# Patient Record
Sex: MALE | Race: WHITE | NOT HISPANIC OR LATINO
[De-identification: names, ages, dates, MRNs, and addresses within clinical notes are randomized per-mention and may not be internally consistent; named-entity substitution may affect disease eponyms.]

---

## 2020-06-28 ENCOUNTER — ERX REFILL RESPONSE (OUTPATIENT)
Age: 65
End: 2020-06-28

## 2020-06-28 RX ORDER — PANTOPRAZOLE SODIUM 40 MG/1
TAKE ONE TABLET BY MOUTH TWICE A DAY TABLET, DELAYED RELEASE ORAL
Qty: 60 | Refills: 1

## 2020-12-03 ENCOUNTER — TELEPHONE ENCOUNTER (OUTPATIENT)
Dept: URBAN - NONMETROPOLITAN AREA CLINIC 2 | Facility: CLINIC | Age: 65
End: 2020-12-03

## 2020-12-04 ENCOUNTER — TELEPHONE ENCOUNTER (OUTPATIENT)
Dept: URBAN - NONMETROPOLITAN AREA CLINIC 2 | Facility: CLINIC | Age: 65
End: 2020-12-04

## 2020-12-09 ENCOUNTER — OFFICE VISIT (OUTPATIENT)
Dept: URBAN - NONMETROPOLITAN AREA CLINIC 2 | Facility: CLINIC | Age: 65
End: 2020-12-09
Payer: COMMERCIAL

## 2020-12-09 DIAGNOSIS — R63.4 WEIGHT LOSS: ICD-10-CM

## 2020-12-09 DIAGNOSIS — C15.9 MALIGNANT NEOPLASM OF ESOPHAGUS, UNSPECIFIED LOCATION: ICD-10-CM

## 2020-12-09 PROCEDURE — G8427 DOCREV CUR MEDS BY ELIG CLIN: HCPCS | Performed by: NURSE PRACTITIONER

## 2020-12-09 PROCEDURE — G9903 PT SCRN TBCO ID AS NON USER: HCPCS | Performed by: NURSE PRACTITIONER

## 2020-12-09 PROCEDURE — 99213 OFFICE O/P EST LOW 20 MIN: CPT | Performed by: NURSE PRACTITIONER

## 2020-12-09 PROCEDURE — G8420 CALC BMI NORM PARAMETERS: HCPCS | Performed by: NURSE PRACTITIONER

## 2020-12-09 RX ORDER — PANTOPRAZOLE SODIUM 40 MG/1
TAKE ONE TABLET BY MOUTH TWICE A DAY TABLET, DELAYED RELEASE ORAL
Qty: 60 | Refills: 1 | Status: ACTIVE | COMMUNITY

## 2020-12-09 RX ORDER — ALBUTEROL SULFATE 90 UG/1
AEROSOL, METERED RESPIRATORY (INHALATION)
Qty: 0 | Refills: 0 | Status: ACTIVE | COMMUNITY
Start: 1900-01-01 | End: 1900-01-01

## 2020-12-09 NOTE — HPI-TODAY'S VISIT:
12/9/2020 Mr. Jordi Aldrich is here for possible PEG placement. He was seen in November by Dr. Chandler and diagnosed with esophageal adenocarcinoma. He was referred to Dr. Jeffery who requested an EUS for staging. This showed evidence of T3 disease. Given the obstruction nature the scope was unable to be advanced through the GE junction. Today, he reports he has stopped seeing Dr. Jeffery. He declined chemo and radiation. He is on hospice. He has lost a considerable amount of weight. He can only eat small amounts of soft foods. He is vomiting after most meals. He is on phenergan and zofran with continued N/V. He is on a fair amount of pain medication. CS

## 2020-12-18 ENCOUNTER — OFFICE VISIT (OUTPATIENT)
Dept: URBAN - METROPOLITAN AREA TELEHEALTH 2 | Facility: TELEHEALTH | Age: 65
End: 2020-12-18
Payer: COMMERCIAL

## 2020-12-18 DIAGNOSIS — C15.9 MALIGNANT NEOPLASM OF ESOPHAGUS, UNSPECIFIED LOCATION: ICD-10-CM

## 2020-12-18 DIAGNOSIS — R63.4 WEIGHT LOSS: ICD-10-CM

## 2020-12-18 PROCEDURE — G8420 CALC BMI NORM PARAMETERS: HCPCS | Performed by: INTERNAL MEDICINE

## 2020-12-18 PROCEDURE — 4004F PT TOBACCO SCREEN RCVD TLK: CPT | Performed by: INTERNAL MEDICINE

## 2020-12-18 PROCEDURE — 99212 OFFICE O/P EST SF 10 MIN: CPT | Performed by: INTERNAL MEDICINE

## 2020-12-18 PROCEDURE — G8427 DOCREV CUR MEDS BY ELIG CLIN: HCPCS | Performed by: INTERNAL MEDICINE

## 2020-12-18 PROCEDURE — G9902 PT SCRN TBCO AND ID AS USER: HCPCS | Performed by: INTERNAL MEDICINE

## 2020-12-18 PROCEDURE — G9906 PT RECV TBCO CESS INTERV: HCPCS | Performed by: INTERNAL MEDICINE

## 2020-12-18 RX ORDER — PANTOPRAZOLE SODIUM 40 MG/1
TAKE ONE TABLET BY MOUTH TWICE A DAY TABLET, DELAYED RELEASE ORAL
Qty: 60 | Refills: 1 | Status: ACTIVE | COMMUNITY

## 2020-12-18 RX ORDER — ALBUTEROL SULFATE 90 UG/1
AEROSOL, METERED RESPIRATORY (INHALATION)
Qty: 0 | Refills: 0 | Status: ACTIVE | COMMUNITY
Start: 1900-01-01

## 2020-12-18 NOTE — HPI-TODAY'S VISIT:
12/18/2020 Mr. Jordi Aldrich is evaluated via telemedicine  for possible esophageal stent placement. He was seen 12/9 for consideration of a feeding tube due to weight loss, N/V, and difficult swallowing. Due to his obstructive esophageal adenocarcinoma a scope would not be able to be passed so he was referred to Dr. Mccullough to consider a surgically placed feeding tube. She did not feel this was an option due to risks. She consulted Dr Frankel and felt his best option was an esophageal stent with Dr. Ye. CS

## 2021-01-13 ENCOUNTER — TELEPHONE ENCOUNTER (OUTPATIENT)
Dept: URBAN - METROPOLITAN AREA CLINIC 92 | Facility: CLINIC | Age: 66
End: 2021-01-13

## 2021-01-13 ENCOUNTER — OFFICE VISIT (OUTPATIENT)
Dept: URBAN - NONMETROPOLITAN AREA CLINIC 2 | Facility: CLINIC | Age: 66
End: 2021-01-13

## 2021-01-22 ENCOUNTER — DASHBOARD ENCOUNTERS (OUTPATIENT)
Age: 66
End: 2021-01-22

## 2021-01-22 ENCOUNTER — OFFICE VISIT (OUTPATIENT)
Dept: URBAN - METROPOLITAN AREA TELEHEALTH 2 | Facility: TELEHEALTH | Age: 66
End: 2021-01-22
Payer: COMMERCIAL

## 2021-01-22 DIAGNOSIS — C15.8 MALIGNANT NEOPLASM OF OVERLAPPING SITES OF ESOPHAGUS: ICD-10-CM

## 2021-01-22 DIAGNOSIS — R63.4 WEIGHT LOSS: ICD-10-CM

## 2021-01-22 DIAGNOSIS — C15.9 MALIGNANT NEOPLASM OF ESOPHAGUS, UNSPECIFIED LOCATION: ICD-10-CM

## 2021-01-22 PROBLEM — 363402007: Status: ACTIVE | Noted: 2020-12-09

## 2021-01-22 PROCEDURE — G8427 DOCREV CUR MEDS BY ELIG CLIN: HCPCS | Performed by: INTERNAL MEDICINE

## 2021-01-22 PROCEDURE — 99213 OFFICE O/P EST LOW 20 MIN: CPT | Performed by: INTERNAL MEDICINE

## 2021-01-22 PROCEDURE — 4004F PT TOBACCO SCREEN RCVD TLK: CPT | Performed by: INTERNAL MEDICINE

## 2021-01-22 PROCEDURE — G8418 CALC BMI BLW LOW PARAM F/U: HCPCS | Performed by: INTERNAL MEDICINE

## 2021-01-22 PROCEDURE — G9906 PT RECV TBCO CESS INTERV: HCPCS | Performed by: INTERNAL MEDICINE

## 2021-01-22 PROCEDURE — G9902 PT SCRN TBCO AND ID AS USER: HCPCS | Performed by: INTERNAL MEDICINE

## 2021-01-22 RX ORDER — PANTOPRAZOLE SODIUM 40 MG/1
TAKE ONE TABLET BY MOUTH TWICE A DAY TABLET, DELAYED RELEASE ORAL
Qty: 60 | Refills: 1 | Status: ACTIVE | COMMUNITY

## 2021-01-22 RX ORDER — ALBUTEROL SULFATE 90 UG/1
AEROSOL, METERED RESPIRATORY (INHALATION)
Qty: 0 | Refills: 0 | Status: ACTIVE | COMMUNITY
Start: 1900-01-01

## 2021-01-22 RX ORDER — METOCLOPRAMIDE HYDROCHLORIDE 5 MG/1
1 TABLET BEFORE MEALS TABLET ORAL TWICE A DAY
Qty: 60 TABLET | Refills: 6 | OUTPATIENT
Start: 2021-01-22

## 2021-01-22 NOTE — HPI-TODAY'S VISIT:
1/22/2021 Mr. Jordi Aldrich is evaluated via telemedicine  for nausea, vomiting, loss of appetite, and esophageal stent placement. He was seen 12/9 for consideration of a feeding tube due to weight loss, N/V, and difficult swallowing. Due to his obstructive esophageal adenocarcinoma a scope would not be able to be passed. Dr Mccullough recommended possible stent. After discussing the risks, he was very hesitant and wanted to think about it. Today, he reports he has lost another 5 pounds. His swallowing is a little better. He has not appetite and when he is able to eat the food sits in his stomach and wants to come back up. If he lays down for an hour this resolves. He has found protein berg he can tolerate. CS

## 2021-01-22 NOTE — HPI-OTHER HISTORIES
12/9/2020 Mr. Jordi Aldrich is here for possible PEG placement. He was seen in November by Dr. Chandler and diagnosed with esophageal adenocarcinoma. He was referred to Dr. Jeffery who requested an EUS for staging. This showed evidence of T3 disease. Given the obstruction nature the scope was unable to be advanced through the GE junction. Today, he reports he has stopped seeing Dr. Jeffery. He declined chemo and radiation. He is on hospice. He has lost a considerable amount of weight. He can only eat small amounts of soft foods. He is vomiting after most meals. He is on phenergan and zofran with continued N/V. He is on a fair amount of pain medication. CS   12/18/2020 Mr. Jordi Aldrich is evaluated via telemedicine  for possible esophageal stent placement. He was seen 12/9 for consideration of a feeding tube due to weight loss, N/V, and difficult swallowing. Due to his obstructive esophageal adenocarcinoma a scope would not be able to be passed so he was referred to Dr. Mccullough to consider a surgically placed feeding tube. She did not feel this was an option due to risks. She consulted Dr Frankel and felt his best option was an esophageal stent with Dr. Ye. CS

## 2021-02-10 ENCOUNTER — OFFICE VISIT (OUTPATIENT)
Dept: URBAN - NONMETROPOLITAN AREA CLINIC 2 | Facility: CLINIC | Age: 66
End: 2021-02-10